# Patient Record
Sex: MALE | Race: WHITE | NOT HISPANIC OR LATINO
[De-identification: names, ages, dates, MRNs, and addresses within clinical notes are randomized per-mention and may not be internally consistent; named-entity substitution may affect disease eponyms.]

---

## 2018-06-02 ENCOUNTER — TRANSCRIPTION ENCOUNTER (OUTPATIENT)
Age: 48
End: 2018-06-02

## 2018-06-02 ENCOUNTER — INPATIENT (INPATIENT)
Facility: HOSPITAL | Age: 48
LOS: 3 days | Discharge: HOME | DRG: 511 | End: 2018-06-06
Attending: STUDENT IN AN ORGANIZED HEALTH CARE EDUCATION/TRAINING PROGRAM | Admitting: STUDENT IN AN ORGANIZED HEALTH CARE EDUCATION/TRAINING PROGRAM
Payer: MEDICARE

## 2018-06-02 VITALS
SYSTOLIC BLOOD PRESSURE: 160 MMHG | HEART RATE: 101 BPM | DIASTOLIC BLOOD PRESSURE: 101 MMHG | RESPIRATION RATE: 16 BRPM | TEMPERATURE: 98 F | OXYGEN SATURATION: 94 %

## 2018-06-02 DIAGNOSIS — F10.920 ALCOHOL USE, UNSPECIFIED WITH INTOXICATION, UNCOMPLICATED: ICD-10-CM

## 2018-06-02 DIAGNOSIS — S52.301B: ICD-10-CM

## 2018-06-02 LAB
ALBUMIN SERPL ELPH-MCNC: 4.4 G/DL — SIGNIFICANT CHANGE UP (ref 3.3–5.2)
ALP SERPL-CCNC: 60 U/L — SIGNIFICANT CHANGE UP (ref 40–120)
ALT FLD-CCNC: 157 U/L — HIGH
ANION GAP SERPL CALC-SCNC: 19 MMOL/L — HIGH (ref 5–17)
APTT BLD: 31.5 SEC — SIGNIFICANT CHANGE UP (ref 27.5–37.4)
AST SERPL-CCNC: 148 U/L — HIGH
BASOPHILS # BLD AUTO: 0 K/UL — SIGNIFICANT CHANGE UP (ref 0–0.2)
BASOPHILS NFR BLD AUTO: 0.4 % — SIGNIFICANT CHANGE UP (ref 0–2)
BILIRUB SERPL-MCNC: 0.6 MG/DL — SIGNIFICANT CHANGE UP (ref 0.4–2)
BLD GP AB SCN SERPL QL: SIGNIFICANT CHANGE UP
BUN SERPL-MCNC: 13 MG/DL — SIGNIFICANT CHANGE UP (ref 8–20)
CALCIUM SERPL-MCNC: 9.1 MG/DL — SIGNIFICANT CHANGE UP (ref 8.6–10.2)
CHLORIDE SERPL-SCNC: 100 MMOL/L — SIGNIFICANT CHANGE UP (ref 98–107)
CO2 SERPL-SCNC: 21 MMOL/L — LOW (ref 22–29)
CREAT SERPL-MCNC: 1.37 MG/DL — HIGH (ref 0.5–1.3)
EOSINOPHIL # BLD AUTO: 0.2 K/UL — SIGNIFICANT CHANGE UP (ref 0–0.5)
EOSINOPHIL NFR BLD AUTO: 2.6 % — SIGNIFICANT CHANGE UP (ref 0–6)
ETHANOL SERPL-MCNC: 118 MG/DL — SIGNIFICANT CHANGE UP
GLUCOSE SERPL-MCNC: 106 MG/DL — SIGNIFICANT CHANGE UP (ref 70–115)
HCT VFR BLD CALC: 44.4 % — SIGNIFICANT CHANGE UP (ref 42–52)
HGB BLD-MCNC: 15.7 G/DL — SIGNIFICANT CHANGE UP (ref 14–18)
INR BLD: 0.98 RATIO — SIGNIFICANT CHANGE UP (ref 0.88–1.16)
LYMPHOCYTES # BLD AUTO: 3.6 K/UL — SIGNIFICANT CHANGE UP (ref 1–4.8)
LYMPHOCYTES # BLD AUTO: 44.9 % — SIGNIFICANT CHANGE UP (ref 20–55)
MCHC RBC-ENTMCNC: 32.2 PG — HIGH (ref 27–31)
MCHC RBC-ENTMCNC: 35.4 G/DL — SIGNIFICANT CHANGE UP (ref 32–36)
MCV RBC AUTO: 91 FL — SIGNIFICANT CHANGE UP (ref 80–94)
MONOCYTES # BLD AUTO: 0.8 K/UL — SIGNIFICANT CHANGE UP (ref 0–0.8)
MONOCYTES NFR BLD AUTO: 9.4 % — SIGNIFICANT CHANGE UP (ref 3–10)
NEUTROPHILS # BLD AUTO: 3.4 K/UL — SIGNIFICANT CHANGE UP (ref 1.8–8)
NEUTROPHILS NFR BLD AUTO: 42.3 % — SIGNIFICANT CHANGE UP (ref 37–73)
PLATELET # BLD AUTO: 279 K/UL — SIGNIFICANT CHANGE UP (ref 150–400)
POTASSIUM SERPL-MCNC: 3.9 MMOL/L — SIGNIFICANT CHANGE UP (ref 3.5–5.3)
POTASSIUM SERPL-SCNC: 3.9 MMOL/L — SIGNIFICANT CHANGE UP (ref 3.5–5.3)
PROT SERPL-MCNC: 7.2 G/DL — SIGNIFICANT CHANGE UP (ref 6.6–8.7)
PROTHROM AB SERPL-ACNC: 10.8 SEC — SIGNIFICANT CHANGE UP (ref 9.8–12.7)
RBC # BLD: 4.88 M/UL — SIGNIFICANT CHANGE UP (ref 4.6–6.2)
RBC # FLD: 12.5 % — SIGNIFICANT CHANGE UP (ref 11–15.6)
SODIUM SERPL-SCNC: 140 MMOL/L — SIGNIFICANT CHANGE UP (ref 135–145)
TYPE + AB SCN PNL BLD: SIGNIFICANT CHANGE UP
WBC # BLD: 8 K/UL — SIGNIFICANT CHANGE UP (ref 4.8–10.8)
WBC # FLD AUTO: 8 K/UL — SIGNIFICANT CHANGE UP (ref 4.8–10.8)

## 2018-06-02 PROCEDURE — 73090 X-RAY EXAM OF FOREARM: CPT | Mod: 26,76,LT

## 2018-06-02 PROCEDURE — 73100 X-RAY EXAM OF WRIST: CPT | Mod: 26,52,LT

## 2018-06-02 PROCEDURE — 72125 CT NECK SPINE W/O DYE: CPT | Mod: 26

## 2018-06-02 PROCEDURE — 70450 CT HEAD/BRAIN W/O DYE: CPT | Mod: 26

## 2018-06-02 PROCEDURE — 99291 CRITICAL CARE FIRST HOUR: CPT

## 2018-06-02 PROCEDURE — 74177 CT ABD & PELVIS W/CONTRAST: CPT | Mod: 26

## 2018-06-02 PROCEDURE — 71260 CT THORAX DX C+: CPT | Mod: 26

## 2018-06-02 PROCEDURE — 71045 X-RAY EXAM CHEST 1 VIEW: CPT | Mod: 26

## 2018-06-02 RX ORDER — CEFAZOLIN SODIUM 1 G
2000 VIAL (EA) INJECTION EVERY 8 HOURS
Qty: 0 | Refills: 0 | Status: DISCONTINUED | OUTPATIENT
Start: 2018-06-02 | End: 2018-06-04

## 2018-06-02 RX ORDER — HYDROMORPHONE HYDROCHLORIDE 2 MG/ML
1 INJECTION INTRAMUSCULAR; INTRAVENOUS; SUBCUTANEOUS EVERY 4 HOURS
Qty: 0 | Refills: 0 | Status: DISCONTINUED | OUTPATIENT
Start: 2018-06-02 | End: 2018-06-03

## 2018-06-02 RX ORDER — SODIUM CHLORIDE 9 MG/ML
1000 INJECTION, SOLUTION INTRAVENOUS
Qty: 0 | Refills: 0 | Status: DISCONTINUED | OUTPATIENT
Start: 2018-06-02 | End: 2018-06-05

## 2018-06-02 RX ORDER — SENNA PLUS 8.6 MG/1
2 TABLET ORAL AT BEDTIME
Qty: 0 | Refills: 0 | Status: DISCONTINUED | OUTPATIENT
Start: 2018-06-02 | End: 2018-06-06

## 2018-06-02 RX ORDER — CEFAZOLIN SODIUM 1 G
2000 VIAL (EA) INJECTION ONCE
Qty: 0 | Refills: 0 | Status: COMPLETED | OUTPATIENT
Start: 2018-06-02 | End: 2018-06-02

## 2018-06-02 RX ORDER — MORPHINE SULFATE 50 MG/1
6 CAPSULE, EXTENDED RELEASE ORAL ONCE
Qty: 0 | Refills: 0 | Status: DISCONTINUED | OUTPATIENT
Start: 2018-06-02 | End: 2018-06-02

## 2018-06-02 RX ORDER — DOCUSATE SODIUM 100 MG
100 CAPSULE ORAL THREE TIMES A DAY
Qty: 0 | Refills: 0 | Status: DISCONTINUED | OUTPATIENT
Start: 2018-06-02 | End: 2018-06-06

## 2018-06-02 RX ORDER — ONDANSETRON 8 MG/1
4 TABLET, FILM COATED ORAL EVERY 6 HOURS
Qty: 0 | Refills: 0 | Status: DISCONTINUED | OUTPATIENT
Start: 2018-06-02 | End: 2018-06-06

## 2018-06-02 RX ORDER — HYDROMORPHONE HYDROCHLORIDE 2 MG/ML
2 INJECTION INTRAMUSCULAR; INTRAVENOUS; SUBCUTANEOUS EVERY 4 HOURS
Qty: 0 | Refills: 0 | Status: DISCONTINUED | OUTPATIENT
Start: 2018-06-02 | End: 2018-06-02

## 2018-06-02 RX ORDER — TETANUS TOXOID, REDUCED DIPHTHERIA TOXOID AND ACELLULAR PERTUSSIS VACCINE, ADSORBED 5; 2.5; 8; 8; 2.5 [IU]/.5ML; [IU]/.5ML; UG/.5ML; UG/.5ML; UG/.5ML
0.5 SUSPENSION INTRAMUSCULAR ONCE
Qty: 0 | Refills: 0 | Status: COMPLETED | OUTPATIENT
Start: 2018-06-02 | End: 2018-06-02

## 2018-06-02 RX ADMIN — Medication 100 MILLIGRAM(S): at 20:51

## 2018-06-02 RX ADMIN — TETANUS TOXOID, REDUCED DIPHTHERIA TOXOID AND ACELLULAR PERTUSSIS VACCINE, ADSORBED 0.5 MILLILITER(S): 5; 2.5; 8; 8; 2.5 SUSPENSION INTRAMUSCULAR at 20:53

## 2018-06-02 RX ADMIN — MORPHINE SULFATE 6 MILLIGRAM(S): 50 CAPSULE, EXTENDED RELEASE ORAL at 21:40

## 2018-06-02 NOTE — H&P ADULT - NSHPPHYSICALEXAM_GEN_ALL_CORE
PHYSICAL EXAM:    Gen: Alert, NAD    Eyes: EOMI, PERRL     Neurological: GCS 15    ENMT: small abrasion Lower lip    Neck: collar in place, unable to clear due to intoxicaiton    Pulmonary: CTA B/l    Cardiovascular: S1S2    Gastrointestinal: soft NT ND    Genitourinary: Appears atraumatic    Back: Appears atraumatic, no step offs    Extremities: LUE obvious angulated mid shaft forearm with small open wound    Skin: abrasion L forefoot    Vascular: palpable distal pulsed through out.
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
## 2018-06-02 NOTE — ED PROVIDER NOTE - CARE PLAN
Principal Discharge DX:	Type I or II open fracture of shaft of right radius, unspecified fracture morphology, initial encounter
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
## 2018-06-02 NOTE — H&P ADULT - PROBLEM SELECTOR PROBLEM 1
Type I or II open fracture of shaft of right radius, unspecified fracture morphology, initial encounter

## 2018-06-02 NOTE — H&P ADULT - PROBLEM SELECTOR PLAN 1
Orthopedics called and Orthopedic PA at bedside, awaiting recs and likely OR for washout and fixation, pain control. Patient was given adacel and 2 grams ancef in trauma bay.
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
## 2018-06-02 NOTE — ED PROVIDER NOTE - SKIN, MLM
Deformity to L forearm. Abrasion MARIELLA foot. Abrasion to chin and underneath lower lip. No laceration.

## 2018-06-02 NOTE — ED PROVIDER NOTE - CARDIAC, MLM
Positive radial pulses intact. Normal rate, regular rhythm.  Heart sounds S1, S2.  No murmurs, rubs or gallops.

## 2018-06-03 DIAGNOSIS — S52.301B: ICD-10-CM

## 2018-06-03 RX ORDER — HYDROMORPHONE HYDROCHLORIDE 2 MG/ML
1 INJECTION INTRAMUSCULAR; INTRAVENOUS; SUBCUTANEOUS EVERY 4 HOURS
Qty: 0 | Refills: 0 | Status: DISCONTINUED | OUTPATIENT
Start: 2018-06-03 | End: 2018-06-04

## 2018-06-03 RX ORDER — METOCLOPRAMIDE HCL 10 MG
10 TABLET ORAL ONCE
Qty: 0 | Refills: 0 | Status: DISCONTINUED | OUTPATIENT
Start: 2018-06-04 | End: 2018-06-04

## 2018-06-03 RX ORDER — FENTANYL CITRATE 50 UG/ML
50 INJECTION INTRAVENOUS
Qty: 0 | Refills: 0 | Status: DISCONTINUED | OUTPATIENT
Start: 2018-06-04 | End: 2018-06-04

## 2018-06-03 RX ORDER — SODIUM CHLORIDE 9 MG/ML
1000 INJECTION, SOLUTION INTRAVENOUS
Qty: 0 | Refills: 0 | Status: DISCONTINUED | OUTPATIENT
Start: 2018-06-04 | End: 2018-06-04

## 2018-06-03 RX ORDER — ONDANSETRON 8 MG/1
4 TABLET, FILM COATED ORAL ONCE
Qty: 0 | Refills: 0 | Status: DISCONTINUED | OUTPATIENT
Start: 2018-06-04 | End: 2018-06-04

## 2018-06-03 RX ORDER — HYDRALAZINE HCL 50 MG
10 TABLET ORAL ONCE
Qty: 0 | Refills: 0 | Status: COMPLETED | OUTPATIENT
Start: 2018-06-03 | End: 2018-06-03

## 2018-06-03 RX ORDER — FENTANYL CITRATE 50 UG/ML
25 INJECTION INTRAVENOUS
Qty: 0 | Refills: 0 | Status: DISCONTINUED | OUTPATIENT
Start: 2018-06-04 | End: 2018-06-04

## 2018-06-03 RX ORDER — ACETAMINOPHEN 500 MG
1000 TABLET ORAL ONCE
Qty: 0 | Refills: 0 | Status: COMPLETED | OUTPATIENT
Start: 2018-06-03 | End: 2018-06-03

## 2018-06-03 RX ORDER — CEFAZOLIN SODIUM 1 G
2000 VIAL (EA) INJECTION ONCE
Qty: 0 | Refills: 0 | Status: DISCONTINUED | OUTPATIENT
Start: 2018-06-03 | End: 2018-06-04

## 2018-06-03 RX ADMIN — Medication 100 MILLIGRAM(S): at 05:21

## 2018-06-03 RX ADMIN — Medication 400 MILLIGRAM(S): at 08:13

## 2018-06-03 RX ADMIN — HYDROMORPHONE HYDROCHLORIDE 1 MILLIGRAM(S): 2 INJECTION INTRAMUSCULAR; INTRAVENOUS; SUBCUTANEOUS at 00:56

## 2018-06-03 RX ADMIN — HYDROMORPHONE HYDROCHLORIDE 1 MILLIGRAM(S): 2 INJECTION INTRAMUSCULAR; INTRAVENOUS; SUBCUTANEOUS at 05:21

## 2018-06-03 RX ADMIN — SODIUM CHLORIDE 75 MILLILITER(S): 9 INJECTION, SOLUTION INTRAVENOUS at 00:55

## 2018-06-03 RX ADMIN — Medication 10 MILLIGRAM(S): at 18:28

## 2018-06-03 RX ADMIN — HYDROMORPHONE HYDROCHLORIDE 1 MILLIGRAM(S): 2 INJECTION INTRAMUSCULAR; INTRAVENOUS; SUBCUTANEOUS at 01:56

## 2018-06-03 RX ADMIN — Medication 1000 MILLIGRAM(S): at 08:40

## 2018-06-03 RX ADMIN — HYDROMORPHONE HYDROCHLORIDE 1 MILLIGRAM(S): 2 INJECTION INTRAMUSCULAR; INTRAVENOUS; SUBCUTANEOUS at 15:36

## 2018-06-03 RX ADMIN — SODIUM CHLORIDE 75 MILLILITER(S): 9 INJECTION, SOLUTION INTRAVENOUS at 13:57

## 2018-06-03 RX ADMIN — Medication 100 MILLIGRAM(S): at 13:56

## 2018-06-03 RX ADMIN — HYDROMORPHONE HYDROCHLORIDE 1 MILLIGRAM(S): 2 INJECTION INTRAMUSCULAR; INTRAVENOUS; SUBCUTANEOUS at 06:29

## 2018-06-03 RX ADMIN — HYDROMORPHONE HYDROCHLORIDE 1 MILLIGRAM(S): 2 INJECTION INTRAMUSCULAR; INTRAVENOUS; SUBCUTANEOUS at 16:00

## 2018-06-03 NOTE — PHYSICAL THERAPY INITIAL EVALUATION ADULT - ACTIVE RANGE OF MOTION EXAMINATION, REHAB EVAL
Right UE Active ROM was WFL (within functional limits)/Left LE Active ROM was WFL (within functional limits)/left UE decreased active and passive shoulder flexion, abducation, elbow and wrist joint NA due to Dx, lright LE decreased active ROM due to pain reported

## 2018-06-03 NOTE — PROGRESS NOTE ADULT - SUBJECTIVE AND OBJECTIVE BOX
Pt seen and evaluated at bedside. Admits to mild pain that is "tolerable" at this time. Denies numbness/tingling.    Physical exam- Pt resting comfortably in bed. Splint intact and appropriately positioned of the LUE. Sensation is grossly intact to light touch. Pt able to move all fingers at time of exam. Unable to re-assess radial pulse due to splint application, cap refill < 2 sec. Hand warm and normal in color.

## 2018-06-03 NOTE — PHYSICAL THERAPY INITIAL EVALUATION ADULT - PLANNED THERAPY INTERVENTIONS, PT EVAL
ROM/gait training/stretching/bed mobility training/balance training/neuromuscular re-education/strengthening/transfer training

## 2018-06-03 NOTE — PHYSICAL THERAPY INITIAL EVALUATION ADULT - IMPAIRMENTS FOUND, PT EVAL
arousal, attention, and cognition/gait, locomotion, and balance/muscle strength/neuromotor development and sensory integration/aerobic capacity/endurance
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
## 2018-06-03 NOTE — PHYSICAL THERAPY INITIAL EVALUATION ADULT - ADDITIONAL COMMENTS
as per pt. : lives in the house with multiple steps with rails, (-)DME, upon D/C home siginficant other will not be available to assist pt at all times

## 2018-06-03 NOTE — PHYSICAL THERAPY INITIAL EVALUATION ADULT - CRITERIA FOR SKILLED THERAPEUTIC INTERVENTIONS
anticipated discharge recommendation/predicted duration of therapy intervention/anticipated equipment needs at discharge/risk reduction/prevention/therapy frequency/functional limitations in following categories/impairments found/rehab potential

## 2018-06-03 NOTE — PROGRESS NOTE ADULT - SUBJECTIVE AND OBJECTIVE BOX
INTERVAL HPI/OVERNIGHT EVENTS: No acute events overnight.    SUBJECTIVE: Pain controlled. NPO, on IVF. No n/v overnight. No fever, chills, chest pain, dyspnea.       MEDICATIONS  (STANDING):  ceFAZolin   IVPB 2000 milliGRAM(s) IV Intermittent every 8 hours  docusate sodium 100 milliGRAM(s) Oral three times a day  hydrALAZINE 10 milliGRAM(s) Oral once  lactated ringers. 1000 milliLiter(s) (75 mL/Hr) IV Continuous <Continuous>    MEDICATIONS  (PRN):  HYDROmorphone  Injectable 1 milliGRAM(s) IV Push every 4 hours PRN Severe Pain (7 - 10)  ondansetron Injectable 4 milliGRAM(s) IV Push every 6 hours PRN Nausea  senna 2 Tablet(s) Oral at bedtime PRN Constipation      Vital Signs Last 24 Hrs  T(C): 37.2 (03 Jun 2018 16:34), Max: 37.6 (03 Jun 2018 00:38)  T(F): 98.9 (03 Jun 2018 16:34), Max: 99.6 (03 Jun 2018 00:38)  HR: 84 (03 Jun 2018 16:34) (84 - 115)  BP: 144/101 (03 Jun 2018 16:34) (119/83 - 165/92)  BP(mean): 113 (03 Jun 2018 04:30) (113 - 113)  RR: 18 (03 Jun 2018 16:34) (13 - 20)  SpO2: 98% (03 Jun 2018 16:34) (93% - 98%)    Physical exam:  General: NAD, AOx3, resting comfortably in bed  HEENT: PERRLA, EOMI  Neck: supple, nontender  Respiratory: no respiratory distress, lungs CTAB  Heart: regular rate and rhythm, no murmurs  Abdomen: soft, nontender, nondistended. Normal bowel sounds. No guarding or rebound.  Extremities: left arm with splint intact; no sensory deficits in right upper extremity      LABS:                        15.7   8.0   )-----------( 279      ( 02 Jun 2018 20:58 )             44.4     06-02    140  |  100  |  13.0  ----------------------------<  106  3.9   |  21.0<L>  |  1.37<H>    Ca    9.1      02 Jun 2018 20:58    TPro  7.2  /  Alb  4.4  /  TBili  0.6  /  DBili  x   /  AST  148<H>  /  ALT  157<H>  /  AlkPhos  60  06-02    PT/INR - ( 02 Jun 2018 20:58 )   PT: 10.8 sec;   INR: 0.98 ratio         PTT - ( 02 Jun 2018 20:58 )  PTT:31.5 sec

## 2018-06-03 NOTE — PHYSICAL THERAPY INITIAL EVALUATION ADULT - GAIT DEVIATIONS NOTED, PT EVAL
decreased velocity of limb motion/decreased weight-shifting ability/unable to accept the weight to right LE,

## 2018-06-04 ENCOUNTER — TRANSCRIPTION ENCOUNTER (OUTPATIENT)
Age: 48
End: 2018-06-04

## 2018-06-04 LAB
ANION GAP SERPL CALC-SCNC: 12 MMOL/L — SIGNIFICANT CHANGE UP (ref 5–17)
BUN SERPL-MCNC: 11 MG/DL — SIGNIFICANT CHANGE UP (ref 8–20)
CALCIUM SERPL-MCNC: 8.8 MG/DL — SIGNIFICANT CHANGE UP (ref 8.6–10.2)
CHLORIDE SERPL-SCNC: 102 MMOL/L — SIGNIFICANT CHANGE UP (ref 98–107)
CO2 SERPL-SCNC: 26 MMOL/L — SIGNIFICANT CHANGE UP (ref 22–29)
CREAT SERPL-MCNC: 1.03 MG/DL — SIGNIFICANT CHANGE UP (ref 0.5–1.3)
GLUCOSE SERPL-MCNC: 120 MG/DL — HIGH (ref 70–115)
MAGNESIUM SERPL-MCNC: 2.2 MG/DL — SIGNIFICANT CHANGE UP (ref 1.6–2.6)
PHOSPHATE SERPL-MCNC: 3.2 MG/DL — SIGNIFICANT CHANGE UP (ref 2.4–4.7)
POTASSIUM SERPL-MCNC: 4 MMOL/L — SIGNIFICANT CHANGE UP (ref 3.5–5.3)
POTASSIUM SERPL-SCNC: 4 MMOL/L — SIGNIFICANT CHANGE UP (ref 3.5–5.3)
SODIUM SERPL-SCNC: 140 MMOL/L — SIGNIFICANT CHANGE UP (ref 135–145)

## 2018-06-04 PROCEDURE — 73090 X-RAY EXAM OF FOREARM: CPT | Mod: 26,LT

## 2018-06-04 PROCEDURE — 99222 1ST HOSP IP/OBS MODERATE 55: CPT

## 2018-06-04 RX ORDER — ACETAMINOPHEN 500 MG
975 TABLET ORAL EVERY 8 HOURS
Qty: 0 | Refills: 0 | Status: DISCONTINUED | OUTPATIENT
Start: 2018-06-04 | End: 2018-06-06

## 2018-06-04 RX ORDER — GABAPENTIN 400 MG/1
300 CAPSULE ORAL EVERY 8 HOURS
Qty: 0 | Refills: 0 | Status: DISCONTINUED | OUTPATIENT
Start: 2018-06-04 | End: 2018-06-06

## 2018-06-04 RX ORDER — HYDRALAZINE HCL 50 MG
10 TABLET ORAL EVERY 4 HOURS
Qty: 0 | Refills: 0 | Status: DISCONTINUED | OUTPATIENT
Start: 2018-06-04 | End: 2018-06-06

## 2018-06-04 RX ORDER — OXYCODONE AND ACETAMINOPHEN 5; 325 MG/1; MG/1
2 TABLET ORAL EVERY 4 HOURS
Qty: 0 | Refills: 0 | Status: DISCONTINUED | OUTPATIENT
Start: 2018-06-04 | End: 2018-06-04

## 2018-06-04 RX ORDER — ACETAMINOPHEN 500 MG
650 TABLET ORAL EVERY 6 HOURS
Qty: 0 | Refills: 0 | Status: DISCONTINUED | OUTPATIENT
Start: 2018-06-04 | End: 2018-06-04

## 2018-06-04 RX ORDER — HYDROMORPHONE HYDROCHLORIDE 2 MG/ML
1 INJECTION INTRAMUSCULAR; INTRAVENOUS; SUBCUTANEOUS EVERY 4 HOURS
Qty: 0 | Refills: 0 | Status: DISCONTINUED | OUTPATIENT
Start: 2018-06-04 | End: 2018-06-04

## 2018-06-04 RX ORDER — HYDRALAZINE HCL 50 MG
5 TABLET ORAL ONCE
Qty: 0 | Refills: 0 | Status: COMPLETED | OUTPATIENT
Start: 2018-06-04 | End: 2018-06-04

## 2018-06-04 RX ORDER — OXYCODONE AND ACETAMINOPHEN 5; 325 MG/1; MG/1
1 TABLET ORAL EVERY 4 HOURS
Qty: 0 | Refills: 0 | Status: DISCONTINUED | OUTPATIENT
Start: 2018-06-04 | End: 2018-06-04

## 2018-06-04 RX ORDER — AMLODIPINE BESYLATE 2.5 MG/1
5 TABLET ORAL ONCE
Qty: 0 | Refills: 0 | Status: COMPLETED | OUTPATIENT
Start: 2018-06-04 | End: 2018-06-04

## 2018-06-04 RX ORDER — ENOXAPARIN SODIUM 100 MG/ML
30 INJECTION SUBCUTANEOUS
Qty: 0 | Refills: 0 | Status: DISCONTINUED | OUTPATIENT
Start: 2018-06-04 | End: 2018-06-06

## 2018-06-04 RX ORDER — OXYCODONE HYDROCHLORIDE 5 MG/1
5 TABLET ORAL
Qty: 0 | Refills: 0 | Status: DISCONTINUED | OUTPATIENT
Start: 2018-06-04 | End: 2018-06-06

## 2018-06-04 RX ORDER — CEFAZOLIN SODIUM 1 G
2000 VIAL (EA) INJECTION EVERY 8 HOURS
Qty: 0 | Refills: 0 | Status: DISCONTINUED | OUTPATIENT
Start: 2018-06-04 | End: 2018-06-04

## 2018-06-04 RX ORDER — KETOROLAC TROMETHAMINE 30 MG/ML
15 SYRINGE (ML) INJECTION EVERY 8 HOURS
Qty: 0 | Refills: 0 | Status: DISCONTINUED | OUTPATIENT
Start: 2018-06-04 | End: 2018-06-04

## 2018-06-04 RX ORDER — OXYCODONE HYDROCHLORIDE 5 MG/1
10 TABLET ORAL
Qty: 0 | Refills: 0 | Status: DISCONTINUED | OUTPATIENT
Start: 2018-06-04 | End: 2018-06-06

## 2018-06-04 RX ORDER — METOPROLOL TARTRATE 50 MG
5 TABLET ORAL ONCE
Qty: 0 | Refills: 0 | Status: COMPLETED | OUTPATIENT
Start: 2018-06-04 | End: 2018-06-04

## 2018-06-04 RX ORDER — CEFAZOLIN SODIUM 1 G
2000 VIAL (EA) INJECTION
Qty: 0 | Refills: 0 | Status: COMPLETED | OUTPATIENT
Start: 2018-06-04 | End: 2018-06-04

## 2018-06-04 RX ADMIN — Medication 100 MILLIGRAM(S): at 14:49

## 2018-06-04 RX ADMIN — Medication 975 MILLIGRAM(S): at 17:43

## 2018-06-04 RX ADMIN — Medication 5 MILLIGRAM(S): at 14:43

## 2018-06-04 RX ADMIN — Medication 100 MILLIGRAM(S): at 04:57

## 2018-06-04 RX ADMIN — HYDROMORPHONE HYDROCHLORIDE 1 MILLIGRAM(S): 2 INJECTION INTRAMUSCULAR; INTRAVENOUS; SUBCUTANEOUS at 05:22

## 2018-06-04 RX ADMIN — FENTANYL CITRATE 50 MICROGRAM(S): 50 INJECTION INTRAVENOUS at 03:00

## 2018-06-04 RX ADMIN — FENTANYL CITRATE 50 MICROGRAM(S): 50 INJECTION INTRAVENOUS at 02:18

## 2018-06-04 RX ADMIN — FENTANYL CITRATE 50 MICROGRAM(S): 50 INJECTION INTRAVENOUS at 02:13

## 2018-06-04 RX ADMIN — HYDROMORPHONE HYDROCHLORIDE 1 MILLIGRAM(S): 2 INJECTION INTRAMUSCULAR; INTRAVENOUS; SUBCUTANEOUS at 03:36

## 2018-06-04 RX ADMIN — OXYCODONE AND ACETAMINOPHEN 2 TABLET(S): 5; 325 TABLET ORAL at 07:48

## 2018-06-04 RX ADMIN — Medication 100 MILLIGRAM(S): at 21:52

## 2018-06-04 RX ADMIN — AMLODIPINE BESYLATE 5 MILLIGRAM(S): 2.5 TABLET ORAL at 11:46

## 2018-06-04 RX ADMIN — FENTANYL CITRATE 50 MICROGRAM(S): 50 INJECTION INTRAVENOUS at 02:41

## 2018-06-04 RX ADMIN — GABAPENTIN 300 MILLIGRAM(S): 400 CAPSULE ORAL at 17:42

## 2018-06-04 RX ADMIN — HYDROMORPHONE HYDROCHLORIDE 1 MILLIGRAM(S): 2 INJECTION INTRAMUSCULAR; INTRAVENOUS; SUBCUTANEOUS at 05:37

## 2018-06-04 RX ADMIN — OXYCODONE AND ACETAMINOPHEN 2 TABLET(S): 5; 325 TABLET ORAL at 08:48

## 2018-06-04 RX ADMIN — Medication 650 MILLIGRAM(S): at 12:45

## 2018-06-04 RX ADMIN — FENTANYL CITRATE 50 MICROGRAM(S): 50 INJECTION INTRAVENOUS at 02:00

## 2018-06-04 RX ADMIN — OXYCODONE HYDROCHLORIDE 10 MILLIGRAM(S): 5 TABLET ORAL at 23:06

## 2018-06-04 RX ADMIN — OXYCODONE HYDROCHLORIDE 10 MILLIGRAM(S): 5 TABLET ORAL at 22:06

## 2018-06-04 RX ADMIN — OXYCODONE HYDROCHLORIDE 5 MILLIGRAM(S): 5 TABLET ORAL at 17:43

## 2018-06-04 RX ADMIN — ENOXAPARIN SODIUM 30 MILLIGRAM(S): 100 INJECTION SUBCUTANEOUS at 04:57

## 2018-06-04 RX ADMIN — FENTANYL CITRATE 50 MICROGRAM(S): 50 INJECTION INTRAVENOUS at 02:30

## 2018-06-04 RX ADMIN — GABAPENTIN 300 MILLIGRAM(S): 400 CAPSULE ORAL at 21:52

## 2018-06-04 RX ADMIN — Medication 975 MILLIGRAM(S): at 18:45

## 2018-06-04 RX ADMIN — Medication 100 MILLIGRAM(S): at 04:56

## 2018-06-04 RX ADMIN — Medication 650 MILLIGRAM(S): at 11:46

## 2018-06-04 RX ADMIN — Medication 5 MILLIGRAM(S): at 15:50

## 2018-06-04 RX ADMIN — Medication 5 MILLIGRAM(S): at 02:00

## 2018-06-04 RX ADMIN — Medication 15 MILLIGRAM(S): at 12:45

## 2018-06-04 RX ADMIN — OXYCODONE HYDROCHLORIDE 5 MILLIGRAM(S): 5 TABLET ORAL at 18:45

## 2018-06-04 RX ADMIN — ENOXAPARIN SODIUM 30 MILLIGRAM(S): 100 INJECTION SUBCUTANEOUS at 17:43

## 2018-06-04 RX ADMIN — Medication 15 MILLIGRAM(S): at 11:44

## 2018-06-04 NOTE — OCCUPATIONAL THERAPY INITIAL EVALUATION ADULT - ADDITIONAL COMMENTS
Pt lives in private home with 2 JENI; bedroom and bathroom on first floor. Bathroom has shower stall with curtains. Pt is right handed. Pt drives. Pt's girlfriend works full-time as a nurse.

## 2018-06-04 NOTE — DISCHARGE NOTE ADULT - PLAN OF CARE
Improved function and pain control Follow all verbal and written instructions. Take medications as prescribed. DO NOT drive, operate machinery, and/or make important decisions while on prescription pain medication. DO NOT hesitate to call Doctor's office with questions or concerns.   ** Elevate left wrist at much as possible  * No heavy lifting of the left upper extremity  * Keflex for 7days  * DO NOT remove or wet splint  * Office follow-up with HAND at 2 weeks from injury Follow up: Please call and make an appointment with Dr. Gooden 2 WEEKS after your date of injury.    Activity: Please remain NON-WEIGHT BEARING to your left upper extremity and keep splint in place.     Diet: May continue regular diet.    Medications: Please take all home medications as prescribed by your primary care doctor. Pain medication has been prescribed for you (percocet). Please, take it as it has been prescribed, do not drive or operate heavy machinery while taking narcotics.  You are encouraged to take over-the-counter tylenol and/or ibuprofen for pain relief when you feel your pain no longer warrants the use of narcotic pain medications, however DO NOT TAKE percocet and tylenol at the same time as they contain the same active ingredient (acetaminophen). Take only percocet OR tylenol. Continue KEFLEX (oral antibiotic) for 7 days, as well.    Wound Care: Please, keep wound site clean and dry. DO NOT remove splint or get splint wet.     Patient is advised to RETURN TO THE EMERGENCY DEPARTMENT for any of the following - worsening pain, fever/chills, nausea/vomiting, altered mental status, chest pain, shortness of breath, or any other new / worsening symptom. Follow up: Please call and make an appointment with your PRIMARY CARE PROVIDER IMMEDIATELY AFTER DISCHARGE for further management of your hypertension (high blood pressure). If you DO NOT have a primary care provider, you may call and make a follow-up appointment at the [facility] (contact information provided below).    Medications: A 30 day prescription for AMLODIPINE (Norvasc) has been sent to your pharmacy for blood pressure control. This is the medication that you were taking while in the hospital, and tolerating well. We recommend that monitor blood pressure at home. ANY FURTHER REFILLS WILL NEED TO BE FILLED BY YOUR PRIMARY CARE PROVIDER. Try to avoid bright lights, loud noises, excessive TV or cell phone use as these may worsen your concussive symptoms. If your concussive symptoms persist, please call and make a follow-up appointment with the [facility] - [phone]
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
## 2018-06-04 NOTE — PROGRESS NOTE ADULT - SUBJECTIVE AND OBJECTIVE BOX
Called by nurse who was contacted by Trauma Team - asking for ortho to reevaluate patient due to numbness in left hand. Patient sitting comfortably in chair with operative arm splinted and elevated. Pt reports decreased sensation in all of his finger tips of left hand, denies pain at the moment. Pt also reports right hip pain when attempting to ambulate. On examination pt has decreased sensation in the 3rd 4th and 5th digits. Pt motor intact of all digits. Fingertips are warm, cap refill <2 sec, no cyanosis.     -Will discuss left hand fingertip numbness with Dr. MCGRATH  -Recommend XRAY of hip to further evaluate hip pain  -Keep left arm elevated, pain control, ABX, NWB LUE

## 2018-06-04 NOTE — DISCHARGE NOTE ADULT - PROVIDER TOKENS
TOKADY:'[id number]' TOKEN:'[id number]',FREE:[LAST:[[facility]],PHONE:[phone]],FAX:[(   )    -],ADDRESS:[[address] [place] [address]]],FREE:[LAST:[[facility]],PHONE:[phone]],FAX:[(   )    -]]

## 2018-06-04 NOTE — PROGRESS NOTE ADULT - SUBJECTIVE AND OBJECTIVE BOX
Pt seen, chart reviewed.  S/p Left Radius and Ulnar Fracture ORIF, POD#1.  VSS.  Adequate pain control.  Resting comfortably.   Tolerating PO intake.  No N/V.    No anesthesia problems noted.  Pt Hypertensive.  Receiving Metoprolol IV.

## 2018-06-04 NOTE — OCCUPATIONAL THERAPY INITIAL EVALUATION ADULT - RANGE OF MOTION EXAMINATION, UPPER EXTREMITY
Right UE Active ROM was WFL (within functional limits)/left shoulder AROM WFL, left elbow AROM not tested due to splint, left wrist AROM not tested due to splint/ORIF, left digits AROM not assessed due to pain Right UE Active ROM was WFL (within functional limits)/left shoulder AROM WFL, left elbow AROM not tested due to splint, left wrist AROM not tested due to splint/ORIF, left digits AROM: pt able to wiggle all 5 digits

## 2018-06-04 NOTE — DISCHARGE NOTE ADULT - ADDITIONAL INSTRUCTIONS
Follow all verbal and written instructions. Take medications as prescribed. DO NOT drive, operate machinery, and/or make important decisions while on prescription pain medication. DO NOT hesitate to call Doctor's office with questions or concerns.   ** Elevate left wrist at much as possible  * No heavy lifting of the left upper extremity  * Keflex for 7days  * DO NOT remove or wet splint  * Office follow-up with HAND at 2 weeks from injury ORTHOPEDIC RECOMMENDATIONS: Follow all verbal and written instructions. Take medications as prescribed. DO NOT drive, operate machinery, and/or make important decisions while on prescription pain medication. DO NOT hesitate to call Doctor's office with questions or concerns.   * Elevate left wrist at much as possible  * No heavy lifting of the left upper extremity  * Keflex for 7days  * DO NOT remove or wet splint  * Office follow-up with HAND at 2 weeks from injury

## 2018-06-04 NOTE — PROGRESS NOTE ADULT - SUBJECTIVE AND OBJECTIVE BOX
ORTHOPEDIC POST-OP PROGRESS NOTE:    Name: HARITHA WEST    MR #: [id number]    Procedure: open left wrist incision and drainage with open reduction internal fixation       DOS: 6/4/2018      Patient reports of moderate post-op pain. he reports of some tingling of the fingers, not thumb. Denies CP, SOB, N/V, numbness/tingling                             15.7   8.0   )-----------( 279      ( 02 Jun 2018 20:58 )             44.4       02 Jun 2018 20:58    140    |  100    |  13.0   ----------------------------<  106    3.9     |  21.0   |  1.37       TPro  7.2    /  Alb  4.4    /  TBili  0.6    /  DBili  x      /  AST  148    /  ALT  157    /  AlkPhos  60     02 Jun 2018 20:58      Vital Signs Last 24 Hrs  T(C): 37 (06-04-18 @ 04:15), Max: 37.2 (06-04-18 @ 03:15)  T(F): 98.6 (06-04-18 @ 04:15), Max: 98.9 (06-04-18 @ 03:15)  HR: 95 (06-04-18 @ 04:15) (85 - 105)  BP: 156/93 (06-04-18 @ 04:15) (144/97 - 165/112)  BP(mean): --  RR: 18 (06-04-18 @ 04:15) (11 - 18)  SpO2: 96% (06-04-18 @ 04:15) (93% - 98%)      General Exam:    General: Pt Alert and oriented, NAD, controlled pain.    Splint / Dressings C/D/I. No bleeding.     Pulses: Cap refill < 2 sec.    Sensation: Grossly intact to light touch without deficit.    Motor: Very limited AROM of the fingers due to pain. No focal motor weaknesses found.        A/P: 47y Male  POD# 0  s/p open left wrist incision and drainage with open reduction internal fixation    - Stable  - Pain Control  - IV Ancef while in house - convert to Keflex upon discharge  - Weight bearing status: NWB left forearm / wrist  - Continue splint  - Continue elevation

## 2018-06-04 NOTE — DISCHARGE NOTE ADULT - PATIENT PORTAL LINK FT
You can access the [facility] Patient Portal, offered by [facility], by registering with the following website: [email or web address]

## 2018-06-04 NOTE — DISCHARGE NOTE ADULT - CARE PLAN
Principal Discharge DX:	Type I or II open fracture of shaft of right radius, unspecified fracture morphology, initial encounter  Goal:	Improved function and pain control  Assessment and plan of treatment:	Follow all verbal and written instructions. Take medications as prescribed. DO NOT drive, operate machinery, and/or make important decisions while on prescription pain medication. DO NOT hesitate to call Doctor's office with questions or concerns.   ** Elevate left wrist at much as possible  * No heavy lifting of the left upper extremity  * Keflex for 7days  * DO NOT remove or wet splint  * Office follow-up with HAND at 2 weeks from injury Principal Discharge DX:	Type I or II open fracture of shaft of right radius, unspecified fracture morphology, initial encounter  Goal:	Improved function and pain control  Assessment and plan of treatment:	Follow up: Please call and make an appointment with Dr. Gooden 2 WEEKS after your date of injury.    Activity: Please remain NON-WEIGHT BEARING to your left upper extremity and keep splint in place.     Diet: May continue regular diet.    Medications: Please take all home medications as prescribed by your primary care doctor. Pain medication has been prescribed for you (percocet). Please, take it as it has been prescribed, do not drive or operate heavy machinery while taking narcotics.  You are encouraged to take over-the-counter tylenol and/or ibuprofen for pain relief when you feel your pain no longer warrants the use of narcotic pain medications, however DO NOT TAKE percocet and tylenol at the same time as they contain the same active ingredient (acetaminophen). Take only percocet OR tylenol. Continue KEFLEX (oral antibiotic) for 7 days, as well.    Wound Care: Please, keep wound site clean and dry. DO NOT remove splint or get splint wet.     Patient is advised to RETURN TO THE EMERGENCY DEPARTMENT for any of the following - worsening pain, fever/chills, nausea/vomiting, altered mental status, chest pain, shortness of breath, or any other new / worsening symptom.  Secondary Diagnosis:	Hypertension, unspecified type Principal Discharge DX:	Type I or II open fracture of shaft of right radius, unspecified fracture morphology, initial encounter  Goal:	Improved function and pain control  Assessment and plan of treatment:	Follow up: Please call and make an appointment with Dr. Gooden 2 WEEKS after your date of injury.    Activity: Please remain NON-WEIGHT BEARING to your left upper extremity and keep splint in place.     Diet: May continue regular diet.    Medications: Please take all home medications as prescribed by your primary care doctor. Pain medication has been prescribed for you (percocet). Please, take it as it has been prescribed, do not drive or operate heavy machinery while taking narcotics.  You are encouraged to take over-the-counter tylenol and/or ibuprofen for pain relief when you feel your pain no longer warrants the use of narcotic pain medications, however DO NOT TAKE percocet and tylenol at the same time as they contain the same active ingredient (acetaminophen). Take only percocet OR tylenol. Continue KEFLEX (oral antibiotic) for 7 days, as well.    Wound Care: Please, keep wound site clean and dry. DO NOT remove splint or get splint wet.     Patient is advised to RETURN TO THE EMERGENCY DEPARTMENT for any of the following - worsening pain, fever/chills, nausea/vomiting, altered mental status, chest pain, shortness of breath, or any other new / worsening symptom.  Secondary Diagnosis:	Hypertension, unspecified type  Assessment and plan of treatment:	Follow up: Please call and make an appointment with your PRIMARY CARE PROVIDER IMMEDIATELY AFTER DISCHARGE for further management of your hypertension (high blood pressure). If you DO NOT have a primary care provider, you may call and make a follow-up appointment at the [facility] [place] [place] (contact information provided below).    Medications: A 30 day prescription for AMLODIPINE (Norvasc) has been sent to your pharmacy for blood pressure control. This is the medication that you were taking while in the hospital, and tolerating well. We recommend that monitor blood pressure at home. ANY FURTHER REFILLS WILL NEED TO BE FILLED BY YOUR PRIMARY CARE PROVIDER.  Secondary Diagnosis:	Concussion  Assessment and plan of treatment:	Try to avoid bright lights, loud noises, excessive TV or cell phone use as these may worsen your concussive symptoms. If your concussive symptoms persist, please call and make a follow-up appointment with the [facility] - [phone]

## 2018-06-04 NOTE — DISCHARGE NOTE ADULT - CARE PROVIDER_API CALL
Maribel Gooden), Plastic Surgery  [address] [place] [address]  Phone: [phone]  Fax: [phone] Maribel Gooden), Plastic Surgery  [address] [place] [address]  Phone: [phone]  Fax: [phone]    [facility],   [address] [place] [address]  Phone: [phone]  Fax: (   )    -    [facility],   Phone: [phone]  Fax: (   )    -

## 2018-06-04 NOTE — OCCUPATIONAL THERAPY INITIAL EVALUATION ADULT - SENSORY TESTS
pt reports left hand feeling "numb;" RN (Raquel) aware pt reports left hand feeling "numb;" RN (Raquel) and ortho PA ([facility]) made aware pt with + capillary refill in all digits to left hand, left digits warm to the touch. pt reports left hand feeling "numb;" RN (Raquel) and ortho PA ([facility]) made aware.

## 2018-06-04 NOTE — DISCHARGE NOTE ADULT - MEDICATION SUMMARY - MEDICATIONS TO TAKE
I will START or STAY ON the medications listed below when I get home from the hospital:    Percocet 5/325 oral tablet  -- 1 tab(s) by mouth every 4-6 hours, As Needed -for severe pain MDD:6  -- Caution federal law prohibits the transfer of this drug to any person other  than the person for whom it was prescribed.  May cause drowsiness.  Alcohol may intensify this effect.  Use care when operating dangerous machinery.  This prescription cannot be refilled.  This product contains acetaminophen.  Do not use  with any other product containing acetaminophen to prevent possible liver damage.  Using more of this medication than prescribed may cause serious breathing problems.    -- Indication: For Pain    gabapentin 300 mg oral capsule  -- 1 cap(s) by mouth every 8 hours  -- Indication: For Pain    amLODIPine 10 mg oral tablet  -- 1 tab(s) by mouth once a day  -- Indication: For Hypertension, unspecified type    Keflex 500 mg oral capsule  -- 1 cap(s) by mouth every 12 hours   -- Finish all this medication unless otherwise directed by prescriber.    -- Indication: For Type I or II open fracture of shaft of right radius, unspecified fracture morphology, initial encounter

## 2018-06-05 LAB
ANION GAP SERPL CALC-SCNC: 12 MMOL/L — SIGNIFICANT CHANGE UP (ref 5–17)
BUN SERPL-MCNC: 9 MG/DL — SIGNIFICANT CHANGE UP (ref 8–20)
CALCIUM SERPL-MCNC: 8.8 MG/DL — SIGNIFICANT CHANGE UP (ref 8.6–10.2)
CHLORIDE SERPL-SCNC: 102 MMOL/L — SIGNIFICANT CHANGE UP (ref 98–107)
CO2 SERPL-SCNC: 26 MMOL/L — SIGNIFICANT CHANGE UP (ref 22–29)
CREAT SERPL-MCNC: 1.05 MG/DL — SIGNIFICANT CHANGE UP (ref 0.5–1.3)
GLUCOSE SERPL-MCNC: 112 MG/DL — SIGNIFICANT CHANGE UP (ref 70–115)
POTASSIUM SERPL-MCNC: 4.2 MMOL/L — SIGNIFICANT CHANGE UP (ref 3.5–5.3)
POTASSIUM SERPL-SCNC: 4.2 MMOL/L — SIGNIFICANT CHANGE UP (ref 3.5–5.3)
SODIUM SERPL-SCNC: 140 MMOL/L — SIGNIFICANT CHANGE UP (ref 135–145)

## 2018-06-05 PROCEDURE — 99232 SBSQ HOSP IP/OBS MODERATE 35: CPT

## 2018-06-05 RX ORDER — AMLODIPINE BESYLATE 2.5 MG/1
10 TABLET ORAL DAILY
Qty: 0 | Refills: 0 | Status: DISCONTINUED | OUTPATIENT
Start: 2018-06-05 | End: 2018-06-06

## 2018-06-05 RX ORDER — KETOROLAC TROMETHAMINE 30 MG/ML
15 SYRINGE (ML) INJECTION EVERY 6 HOURS
Qty: 0 | Refills: 0 | Status: DISCONTINUED | OUTPATIENT
Start: 2018-06-05 | End: 2018-06-06

## 2018-06-05 RX ORDER — AMLODIPINE BESYLATE 2.5 MG/1
5 TABLET ORAL DAILY
Qty: 0 | Refills: 0 | Status: DISCONTINUED | OUTPATIENT
Start: 2018-06-05 | End: 2018-06-05

## 2018-06-05 RX ADMIN — GABAPENTIN 300 MILLIGRAM(S): 400 CAPSULE ORAL at 12:12

## 2018-06-05 RX ADMIN — OXYCODONE HYDROCHLORIDE 5 MILLIGRAM(S): 5 TABLET ORAL at 11:47

## 2018-06-05 RX ADMIN — GABAPENTIN 300 MILLIGRAM(S): 400 CAPSULE ORAL at 21:31

## 2018-06-05 RX ADMIN — ENOXAPARIN SODIUM 30 MILLIGRAM(S): 100 INJECTION SUBCUTANEOUS at 17:45

## 2018-06-05 RX ADMIN — Medication 975 MILLIGRAM(S): at 17:45

## 2018-06-05 RX ADMIN — Medication 100 MILLIGRAM(S): at 04:14

## 2018-06-05 RX ADMIN — Medication 15 MILLIGRAM(S): at 18:45

## 2018-06-05 RX ADMIN — Medication 100 MILLIGRAM(S): at 11:46

## 2018-06-05 RX ADMIN — Medication 15 MILLIGRAM(S): at 17:45

## 2018-06-05 RX ADMIN — GABAPENTIN 300 MILLIGRAM(S): 400 CAPSULE ORAL at 04:14

## 2018-06-05 RX ADMIN — ENOXAPARIN SODIUM 30 MILLIGRAM(S): 100 INJECTION SUBCUTANEOUS at 04:14

## 2018-06-05 RX ADMIN — Medication 100 MILLIGRAM(S): at 21:32

## 2018-06-05 RX ADMIN — AMLODIPINE BESYLATE 10 MILLIGRAM(S): 2.5 TABLET ORAL at 12:12

## 2018-06-05 RX ADMIN — Medication 975 MILLIGRAM(S): at 12:45

## 2018-06-05 RX ADMIN — OXYCODONE HYDROCHLORIDE 5 MILLIGRAM(S): 5 TABLET ORAL at 12:45

## 2018-06-05 RX ADMIN — Medication 15 MILLIGRAM(S): at 11:45

## 2018-06-05 RX ADMIN — OXYCODONE HYDROCHLORIDE 10 MILLIGRAM(S): 5 TABLET ORAL at 04:13

## 2018-06-05 RX ADMIN — Medication 15 MILLIGRAM(S): at 06:48

## 2018-06-05 RX ADMIN — OXYCODONE HYDROCHLORIDE 5 MILLIGRAM(S): 5 TABLET ORAL at 18:45

## 2018-06-05 RX ADMIN — Medication 975 MILLIGRAM(S): at 18:45

## 2018-06-05 RX ADMIN — Medication 15 MILLIGRAM(S): at 12:45

## 2018-06-05 RX ADMIN — Medication 975 MILLIGRAM(S): at 11:46

## 2018-06-05 RX ADMIN — OXYCODONE HYDROCHLORIDE 10 MILLIGRAM(S): 5 TABLET ORAL at 05:13

## 2018-06-05 RX ADMIN — OXYCODONE HYDROCHLORIDE 5 MILLIGRAM(S): 5 TABLET ORAL at 17:46
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
## 2018-06-05 NOTE — PROGRESS NOTE ADULT - SUBJECTIVE AND OBJECTIVE BOX
Patient seen and eval at bedside. Patient states numbness is improved left hand fingers compared to yesterday. Patient still has pain at operative site but states is improving.  Denies CP, SOB, dizziness, fever ,chills    Vital Signs Last 24 Hrs  T(C): 36.6 (05 Jun 2018 07:49), Max: 37.7 (04 Jun 2018 23:30)  T(F): 97.8 (05 Jun 2018 07:49), Max: 99.9 (04 Jun 2018 23:30)  HR: 105 (05 Jun 2018 07:49) (65 - 114)  BP: 164/106 (05 Jun 2018 07:49) (157/103 - 173/100)  BP(mean): --  RR: 16 (05 Jun 2018 07:49) (16 - 20)  SpO2: 96% (05 Jun 2018 07:49) (94% - 98%)    PE: NAD, alert awake  Left arm splint intact, elevation pillow applied  Dressing has some bloody drainage volar aspect of forearm. Volar vertical incision has some slight bloody drainage. Otherwise incisions healing well, no s/o infx, + swelling of left forearm however soft, compressible  New Xeroform, dry gauze and webril placed, splint re-applied andwrapped with ace wrap.  AIN/PIN/intrinsics intact, Cap refill brisk  SILT uln/med/rad distrib.    A/P: s/p left radius/ulna ORIF POD#3    ·	pain control  ·	Ortho stable  ·	Elevate left UE, cont Splint  ·	DVT propx: Lov  ·	Cont care as per primary team  ·	Will s/o, call with questions [phone]
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
## 2018-06-06 VITALS
DIASTOLIC BLOOD PRESSURE: 89 MMHG | RESPIRATION RATE: 18 BRPM | HEART RATE: 99 BPM | OXYGEN SATURATION: 97 % | SYSTOLIC BLOOD PRESSURE: 143 MMHG | TEMPERATURE: 98 F

## 2018-06-06 DIAGNOSIS — I10 ESSENTIAL (PRIMARY) HYPERTENSION: ICD-10-CM

## 2018-06-06 LAB
BASOPHILS # BLD AUTO: 0 K/UL — SIGNIFICANT CHANGE UP (ref 0–0.2)
BASOPHILS NFR BLD AUTO: 0.3 % — SIGNIFICANT CHANGE UP (ref 0–2)
EOSINOPHIL # BLD AUTO: 0.2 K/UL — SIGNIFICANT CHANGE UP (ref 0–0.5)
EOSINOPHIL NFR BLD AUTO: 2.1 % — SIGNIFICANT CHANGE UP (ref 0–5)
HCT VFR BLD CALC: 33.3 % — LOW (ref 42–52)
HGB BLD-MCNC: 11 G/DL — LOW (ref 14–18)
LYMPHOCYTES # BLD AUTO: 1.2 K/UL — SIGNIFICANT CHANGE UP (ref 1–4.8)
LYMPHOCYTES # BLD AUTO: 14.9 % — LOW (ref 20–55)
MCHC RBC-ENTMCNC: 31.5 PG — HIGH (ref 27–31)
MCHC RBC-ENTMCNC: 33 G/DL — SIGNIFICANT CHANGE UP (ref 32–36)
MCV RBC AUTO: 95.4 FL — HIGH (ref 80–94)
MONOCYTES # BLD AUTO: 1.1 K/UL — HIGH (ref 0–0.8)
MONOCYTES NFR BLD AUTO: 13.7 % — HIGH (ref 3–10)
NEUTROPHILS # BLD AUTO: 5.3 K/UL — SIGNIFICANT CHANGE UP (ref 1.8–8)
NEUTROPHILS NFR BLD AUTO: 68.9 % — SIGNIFICANT CHANGE UP (ref 37–73)
PLATELET # BLD AUTO: 199 K/UL — SIGNIFICANT CHANGE UP (ref 150–400)
RBC # BLD: 3.49 M/UL — LOW (ref 4.6–6.2)
RBC # FLD: 12.3 % — SIGNIFICANT CHANGE UP (ref 11–15.6)
WBC # BLD: 7.7 K/UL — SIGNIFICANT CHANGE UP (ref 4.8–10.8)
WBC # FLD AUTO: 7.7 K/UL — SIGNIFICANT CHANGE UP (ref 4.8–10.8)

## 2018-06-06 PROCEDURE — 85610 PROTHROMBIN TIME: CPT

## 2018-06-06 PROCEDURE — 86900 BLOOD TYPING SEROLOGIC ABO: CPT

## 2018-06-06 PROCEDURE — C1713: CPT

## 2018-06-06 PROCEDURE — 76000 FLUOROSCOPY <1 HR PHYS/QHP: CPT

## 2018-06-06 PROCEDURE — 70450 CT HEAD/BRAIN W/O DYE: CPT

## 2018-06-06 PROCEDURE — 90715 TDAP VACCINE 7 YRS/> IM: CPT

## 2018-06-06 PROCEDURE — 74177 CT ABD & PELVIS W/CONTRAST: CPT

## 2018-06-06 PROCEDURE — 86850 RBC ANTIBODY SCREEN: CPT

## 2018-06-06 PROCEDURE — 85730 THROMBOPLASTIN TIME PARTIAL: CPT

## 2018-06-06 PROCEDURE — C1769: CPT

## 2018-06-06 PROCEDURE — 84100 ASSAY OF PHOSPHORUS: CPT

## 2018-06-06 PROCEDURE — G0390: CPT

## 2018-06-06 PROCEDURE — 86901 BLOOD TYPING SEROLOGIC RH(D): CPT

## 2018-06-06 PROCEDURE — 97167 OT EVAL HIGH COMPLEX 60 MIN: CPT

## 2018-06-06 PROCEDURE — 97530 THERAPEUTIC ACTIVITIES: CPT

## 2018-06-06 PROCEDURE — 90471 IMMUNIZATION ADMIN: CPT

## 2018-06-06 PROCEDURE — 96374 THER/PROPH/DIAG INJ IV PUSH: CPT

## 2018-06-06 PROCEDURE — 71045 X-RAY EXAM CHEST 1 VIEW: CPT

## 2018-06-06 PROCEDURE — 83735 ASSAY OF MAGNESIUM: CPT

## 2018-06-06 PROCEDURE — 97163 PT EVAL HIGH COMPLEX 45 MIN: CPT

## 2018-06-06 PROCEDURE — 73090 X-RAY EXAM OF FOREARM: CPT

## 2018-06-06 PROCEDURE — 85027 COMPLETE CBC AUTOMATED: CPT

## 2018-06-06 PROCEDURE — 72125 CT NECK SPINE W/O DYE: CPT

## 2018-06-06 PROCEDURE — 80053 COMPREHEN METABOLIC PANEL: CPT

## 2018-06-06 PROCEDURE — 97116 GAIT TRAINING THERAPY: CPT

## 2018-06-06 PROCEDURE — 80048 BASIC METABOLIC PNL TOTAL CA: CPT

## 2018-06-06 PROCEDURE — 80307 DRUG TEST PRSMV CHEM ANLYZR: CPT

## 2018-06-06 PROCEDURE — 73100 X-RAY EXAM OF WRIST: CPT

## 2018-06-06 PROCEDURE — 36415 COLL VENOUS BLD VENIPUNCTURE: CPT

## 2018-06-06 PROCEDURE — 71260 CT THORAX DX C+: CPT

## 2018-06-06 PROCEDURE — 97535 SELF CARE MNGMENT TRAINING: CPT

## 2018-06-06 PROCEDURE — 97110 THERAPEUTIC EXERCISES: CPT

## 2018-06-06 PROCEDURE — 96375 TX/PRO/DX INJ NEW DRUG ADDON: CPT

## 2018-06-06 PROCEDURE — 99291 CRITICAL CARE FIRST HOUR: CPT | Mod: 25

## 2018-06-06 RX ORDER — GABAPENTIN 400 MG/1
1 CAPSULE ORAL
Qty: 21 | Refills: 0 | OUTPATIENT
Start: 2018-06-06 | End: 2018-06-12

## 2018-06-06 RX ORDER — AMLODIPINE BESYLATE 2.5 MG/1
1 TABLET ORAL
Qty: 30 | Refills: 0 | OUTPATIENT
Start: 2018-06-06 | End: 2018-07-05

## 2018-06-06 RX ORDER — CEPHALEXIN 500 MG
1 CAPSULE ORAL
Qty: 14 | Refills: 0 | OUTPATIENT
Start: 2018-06-06 | End: 2018-06-12

## 2018-06-06 RX ORDER — KETOROLAC TROMETHAMINE 30 MG/ML
15 SYRINGE (ML) INJECTION EVERY 8 HOURS
Qty: 0 | Refills: 0 | Status: DISCONTINUED | OUTPATIENT
Start: 2018-06-06 | End: 2018-06-06

## 2018-06-06 RX ADMIN — ENOXAPARIN SODIUM 30 MILLIGRAM(S): 100 INJECTION SUBCUTANEOUS at 05:53

## 2018-06-06 RX ADMIN — Medication 100 MILLIGRAM(S): at 05:53

## 2018-06-06 RX ADMIN — Medication 100 MILLIGRAM(S): at 15:46

## 2018-06-06 RX ADMIN — GABAPENTIN 300 MILLIGRAM(S): 400 CAPSULE ORAL at 05:53

## 2018-06-06 RX ADMIN — Medication 15 MILLIGRAM(S): at 06:08

## 2018-06-06 RX ADMIN — AMLODIPINE BESYLATE 10 MILLIGRAM(S): 2.5 TABLET ORAL at 05:53

## 2018-06-06 RX ADMIN — OXYCODONE HYDROCHLORIDE 10 MILLIGRAM(S): 5 TABLET ORAL at 05:40

## 2018-06-06 RX ADMIN — Medication 15 MILLIGRAM(S): at 00:53

## 2018-06-06 RX ADMIN — Medication 15 MILLIGRAM(S): at 12:30

## 2018-06-06 RX ADMIN — GABAPENTIN 300 MILLIGRAM(S): 400 CAPSULE ORAL at 15:46

## 2018-06-06 RX ADMIN — Medication 15 MILLIGRAM(S): at 01:08

## 2018-06-06 RX ADMIN — Medication 15 MILLIGRAM(S): at 05:53

## 2018-06-06 RX ADMIN — OXYCODONE HYDROCHLORIDE 10 MILLIGRAM(S): 5 TABLET ORAL at 04:40

## 2018-06-06 RX ADMIN — Medication 975 MILLIGRAM(S): at 12:30

## 2018-06-06 RX ADMIN — Medication 15 MILLIGRAM(S): at 12:09

## 2018-06-06 RX ADMIN — Medication 975 MILLIGRAM(S): at 12:09

## 2018-06-06 NOTE — PROGRESS NOTE ADULT - PROBLEM SELECTOR PLAN 2
- Norvasc increased yesterday to 10mg QD  - Pt educated on importance of continuing BP medication at home once discharged and follow-up with PCP for further management. Pt expressed verbal understanding of importance and is in agreement with plan.

## 2018-06-06 NOTE — PROGRESS NOTE ADULT - SUBJECTIVE AND OBJECTIVE BOX
HPI/OVERNIGHT EVENTS: Patient seen and examined at bedside. He states that pain to LUE is improving and well controlled with current medication regimen. Denies worsening numbness / tingling to affected extremity. Pt has been tolerating regular diet without abd pain, nausea, vomiting. OOB working with PT & OT. Denies fever, chills, CP, SOB.    MEDICATIONS  (STANDING):  acetaminophen   Tablet. 975 milliGRAM(s) Oral every 8 hours  amLODIPine   Tablet 10 milliGRAM(s) Oral daily  docusate sodium 100 milliGRAM(s) Oral three times a day  enoxaparin Injectable 30 milliGRAM(s) SubCutaneous two times a day  gabapentin 300 milliGRAM(s) Oral every 8 hours  ketorolac   Injectable 15 milliGRAM(s) IV Push every 6 hours    MEDICATIONS  (PRN):  hydrALAZINE Injectable 10 milliGRAM(s) IV Push every 4 hours PRN SBP>180 and/or DBP>110  ondansetron Injectable 4 milliGRAM(s) IV Push every 6 hours PRN Nausea  oxyCODONE    IR 5 milliGRAM(s) Oral every 3 hours PRN Moderate Pain (4 - 6)  oxyCODONE    IR 10 milliGRAM(s) Oral every 3 hours PRN Severe Pain (7 - 10)  senna 2 Tablet(s) Oral at bedtime PRN Constipation      Vital Signs Last 24 Hrs  T(C): 37.2 (06 Jun 2018 04:10), Max: 37.4 (05 Jun 2018 21:10)  T(F): 99 (06 Jun 2018 04:10), Max: 99.4 (05 Jun 2018 21:10)  HR: 96 (06 Jun 2018 04:10) (93 - 116)  BP: 143/100 (06 Jun 2018 04:10) (143/100 - 160/98)  BP(mean): --  RR: 18 (06 Jun 2018 04:10) (16 - 18)  SpO2: 98% (06 Jun 2018 04:10) (94% - 98%)    Constitutional: patient resting comfortably in bed, in no acute distress  HEENT: EOMI / PERRL b/l  Neck: supple, ROM without pain  Respiratory: respirations are unlabored, no accessory muscle use, no conversational dyspnea  Cardiovascular: regular rate & rhythm  Gastrointestinal: Abdomen soft, non-tender, non-distended, no rebound tenderness / guarding  Extremities: LUE with splint in place, overlying ACE C/D/I, LUE is swollen but compartments are soft, moving fingers freely, distal sensation grossly intact  Neurological: GCS: 15 (4/5/6). A&O x 3    I&O's Detail    05 Jun 2018 07:01  -  06 Jun 2018 07:00  --------------------------------------------------------  IN:    Oral Fluid: 720 mL  Total IN: 720 mL    OUT:  Total OUT: 0 mL    Total NET: 720 mL          LABS:    06-05    140  |  102  |  9.0  ----------------------------<  112  4.2   |  26.0  |  1.05    Ca    8.8      05 Jun 2018 05:49  Phos  3.2     06-04  Mg     2.2     06-04

## 2019-07-23 ENCOUNTER — APPOINTMENT (OUTPATIENT)
Dept: CARDIOLOGY | Facility: CLINIC | Age: 49
End: 2019-07-23

## 2021-06-02 PROCEDURE — 0: CUSTOM

## 2022-05-23 NOTE — PATIENT PROFILE ADULT. - SW.
I reviewed the H&P, I examined the patient, and there are no changes in the patient's condition.    
social work

## 2022-10-06 NOTE — ED PROVIDER NOTE - PRINCIPAL DIAGNOSIS
71 Type I or II open fracture of shaft of right radius, unspecified fracture morphology, initial encounter

## 2023-09-26 NOTE — H&P ADULT - GLASGOW COMA SCALE: BEST VERBAL RESPONSE, MLM
Speech Therapy    Patient not seen in therapy today.    Additional details:  Attempted swallow analysis; patient refused all po trials      OBJECTIVE                       Documented in the chart in the following areas: Assessment.        Therapy procedure time and total treatment time can be found documented on the Time Entry flowsheet   (V5) oriented

## 2024-02-27 NOTE — DISCHARGE NOTE ADULT - NS AS DC PROVIDER CONTACT Y/N MULTI
[de-identified] : At this time we discussed different treatment options, he would like to try cortisone injection today. Today under sterile technique the area was cleaned and prepped with alcohol, anesthetized with cold spray and prepped with betadine.  With the patient's consent, I injected 2.5 cc of dexamethasone and 2.5 cc of 1% lidocaine into the left subacromial space. The patient tolerated this well. The puncture site was cleaned and covered with a Band-Aid.  He can alternate between ice and warm compresses.  We discussed an MRI but he would like to hold off on that for now.  Will see him back in a few weeks for repeat evaluation if the pain is not improving. Patient will call me if any other problems or concerns.  Patient verbalized understanding and agreed with the plan, all questions were answered in the office today. Yes

## 2024-03-11 NOTE — PATIENT PROFILE ADULT. - FUNCTIONAL SCREEN CURRENT LEVEL: EATING, MLM
(0) independent Alert-The patient is alert, awake and responds to voice. The patient is oriented to time, place, and person. The triage nurse is able to obtain subjective information.
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
## 2025-08-07 ENCOUNTER — APPOINTMENT (OUTPATIENT)
Dept: ORTHOPEDIC SURGERY | Facility: CLINIC | Age: 55
End: 2025-08-07
Payer: COMMERCIAL

## 2025-08-07 DIAGNOSIS — M77.11 LATERAL EPICONDYLITIS, RIGHT ELBOW: ICD-10-CM

## 2025-08-07 DIAGNOSIS — I10 ESSENTIAL (PRIMARY) HYPERTENSION: ICD-10-CM

## 2025-08-07 DIAGNOSIS — Z78.9 OTHER SPECIFIED HEALTH STATUS: ICD-10-CM

## 2025-08-07 PROCEDURE — 73080 X-RAY EXAM OF ELBOW: CPT | Mod: RT

## 2025-08-07 PROCEDURE — 99204 OFFICE O/P NEW MOD 45 MIN: CPT

## 2025-08-07 RX ORDER — MELOXICAM 15 MG/1
15 TABLET ORAL
Qty: 30 | Refills: 2 | Status: ACTIVE | COMMUNITY
Start: 2025-08-07 | End: 1900-01-01

## 2025-08-07 RX ORDER — DICLOFENAC SODIUM 10 MG/G
1 GEL TOPICAL
Qty: 1 | Refills: 0 | Status: ACTIVE | COMMUNITY
Start: 2025-08-07 | End: 1900-01-01

## 2025-08-07 RX ORDER — AMLODIPINE BESYLATE 5 MG/1
TABLET ORAL
Refills: 0 | Status: ACTIVE | COMMUNITY

## 2025-09-18 ENCOUNTER — APPOINTMENT (OUTPATIENT)
Dept: ORTHOPEDIC SURGERY | Facility: CLINIC | Age: 55
End: 2025-09-18